# Patient Record
Sex: FEMALE | HISPANIC OR LATINO | Employment: UNEMPLOYED | URBAN - METROPOLITAN AREA
[De-identification: names, ages, dates, MRNs, and addresses within clinical notes are randomized per-mention and may not be internally consistent; named-entity substitution may affect disease eponyms.]

---

## 2024-01-08 ENCOUNTER — TELEPHONE (OUTPATIENT)
Dept: OBSTETRICS AND GYNECOLOGY | Facility: CLINIC | Age: 19
End: 2024-01-08
Payer: COMMERCIAL

## 2024-01-08 NOTE — TELEPHONE ENCOUNTER
Sandy,     Needs appt ASAP.     Thanks,   Dr. Zepeda    New Ob, approx 38wks just moved here from [place].  Requesting to establish care with you.  Reports was up to date with prenatal care in [place], has her medical records with her.    Please advise.     Thanks   Sandy    Pt #[phone]        Referral msg:  PATIENT HASN'T BEEN SEEN AT [facility]//NEW INCOMING SELF REFERRAL//WISHES TO EST CARE AND NEW OB//LISSY 1/21/24//REQUEST DR ZEPEDA OR FIRST AVAILABLE//PATIENT JUST [place]IV[place] IN THE [place] A FEW DAYS AGO AND HASN'T HAD ANY CARE IN THE [place]//PATIENT WAS UP TO DATE WITH OB CARE IN [place]//HAS MEDICAL RECORDS//SENDING CHART TO SCHEDULING REVIEW FOR APPROVAL OF TRANSFER OF CARE

## 2024-01-09 ENCOUNTER — INITIAL PRENATAL (OUTPATIENT)
Dept: OBSTETRICS AND GYNECOLOGY | Facility: CLINIC | Age: 19
End: 2024-01-09
Payer: COMMERCIAL

## 2024-01-09 VITALS
SYSTOLIC BLOOD PRESSURE: 113 MMHG | DIASTOLIC BLOOD PRESSURE: 81 MMHG | WEIGHT: 135 LBS | BODY MASS INDEX: 22.49 KG/M2 | HEIGHT: 65 IN

## 2024-01-09 DIAGNOSIS — Z78.9 VARICELLA VACCINATION STATUS UNKNOWN: ICD-10-CM

## 2024-01-09 DIAGNOSIS — Z11.3 SCREEN FOR STD (SEXUALLY TRANSMITTED DISEASE): ICD-10-CM

## 2024-01-09 DIAGNOSIS — Z12.4 SCREENING FOR CERVICAL CANCER: ICD-10-CM

## 2024-01-09 DIAGNOSIS — Z36.9 ANTENATAL SCREENING ENCOUNTER: ICD-10-CM

## 2024-01-09 DIAGNOSIS — Z34.03 ENCOUNTER FOR SUPERVISION OF NORMAL FIRST PREGNANCY IN THIRD TRIMESTER: Primary | ICD-10-CM

## 2024-01-09 LAB
GLUCOSE UR STRIP-MCNC: NEGATIVE MG/DL
PROT UR STRIP-MCNC: NEGATIVE MG/DL

## 2024-01-09 RX ORDER — SWAB
1 SWAB, NON-MEDICATED MISCELLANEOUS DAILY
Qty: 90 EACH | Refills: 3 | Status: SHIPPED | OUTPATIENT
Start: 2024-01-09

## 2024-01-09 RX ORDER — SWAB
SWAB, NON-MEDICATED MISCELLANEOUS
COMMUNITY
End: 2024-01-09

## 2024-01-09 NOTE — PROGRESS NOTES
Initial ob visit     CC- Here for care of pregnancy     Andrew Pino is being seen today for her first obstetrical visit.  She is a 18 y.o.  38w2d gestation.     # 1 - Date: None, Sex: None, Weight: None, GA: None, Delivery: None, Apgar1: None, Apgar5: None, Living: None, Birth Comments: None    [place] to [place] from [place] - 21 - had routine care in [place] up until this time     Reports overall normal prenatal care in [place] starting in first trimester. Does have records - but in [place], Did get hospitalized for pyelonephritis at 22 weeks   Reviewed what I could - overall records look normal and complete, just unable to decipher due to language issue     Prior obstetric issues, potential pregnancy concerns: transfer of care from [place], has records, but in [place]   Family history of genetic issues (includes FOB): none  Prior infections concerning in pregnancy (Rash, fever in last 2 weeks): none  Varicella Hx -negative history  Prior testing for Cystic Fibrosis Carrier or Sickle Cell Trait- unknown status   Prepregnancy BMI - Body mass index is 22.47 kg/m².  Hx of HSV for patient or partner : No      History reviewed. No pertinent past medical history.    History reviewed. No pertinent surgical history.      Current Outpatient Medications:     Prenatal 28-0.8 MG tablet, Take 1 tablet by mouth Daily. Please use formulary or generic, with DHA ideal, Disp: 90 each, Rfl: 3    No Known Allergies    Social History     Socioeconomic History    Marital status: Single   Tobacco Use    Smoking status: Never   Vaping Use    Vaping Use: Never used   Substance and Sexual Activity    Alcohol use: Never    Drug use: Never    Sexual activity: Yes     Partners: Male       Family History   Problem Relation Age of Onset    Colon cancer Maternal Uncle     Breast cancer Maternal Great-Grandmother     Ovarian cancer Neg Hx     Uterine cancer Neg Hx     Deep vein thrombosis Neg Hx     Pulmonary embolism Neg Hx   "      Review of systems     Review of Systems   Constitutional:  Negative for fever.   Eyes:  Negative for visual disturbance.   Gastrointestinal:  Negative for abdominal pain, nausea and vomiting.   Genitourinary:  Negative for breast pain, pelvic pain, vaginal bleeding and vaginal discharge.   Neurological:  Negative for headache.   All other systems reviewed and are negative.          Objective    /81   Ht 165.1 cm (65\")   Wt 61.2 kg (135 lb)   BMI 22.47 kg/m²     Prenatal Assessment  Fetal Heart Rate: 156  Fundal Height (cm): 34 cm  Movement: Present  Presentation: Vertex  Dilation/Effacement/Station  Dilation: Closed  Effacement (%): 20  Station: -2  Edema  LLE Edema: Trace  RLE Edema: Trace  Facial Edema: None    Physical Exam  Constitutional:       General: She is not in acute distress.     Appearance: Normal appearance. She is normal weight.   Genitourinary:      Right Labia: No lesions or Bartholin's cyst.     Left Labia: No lesions or Bartholin's cyst.     No inguinal adenopathy present in the right or left side.     No vaginal discharge or bleeding.        Right Adnexa: not tender, not full and no mass present.     Left Adnexa: not tender, not full and no mass present.     No cervical motion tenderness or lesion.      Uterus is enlarged.      Uterus is not tender.      No uterine mass detected.  Breasts:     Right: No inverted nipple, mass or nipple discharge.      Left: No inverted nipple, mass or nipple discharge.   HENT:      Head: Normocephalic and atraumatic.      Mouth/Throat:      Mouth: Mucous membranes are moist.   Eyes:      Conjunctiva/sclera: Conjunctivae normal.   Cardiovascular:      Rate and Rhythm: Normal rate and regular rhythm.      Pulses: Normal pulses.      Heart sounds: No murmur heard.  Pulmonary:      Effort: Pulmonary effort is normal. No respiratory distress.      Breath sounds: Normal breath sounds.   Abdominal:      General: There is distension (c/w term).      " Palpations: Abdomen is soft.      Tenderness: There is no abdominal tenderness.   Musculoskeletal:         General: No tenderness.      Cervical back: Normal range of motion and neck supple. No tenderness.      Right lower leg: Edema (trace) present.      Left lower leg: Edema present.   Lymphadenopathy:      Cervical: No cervical adenopathy.      Lower Body: No right inguinal adenopathy. No left inguinal adenopathy.   Neurological:      General: No focal deficit present.      Mental Status: She is alert.      Deep Tendon Reflexes: Reflexes normal.   Skin:     General: Skin is warm and dry.      Capillary Refill: Capillary refill takes less than 2 seconds.   Psychiatric:         Thought Content: Thought content normal.   Vitals reviewed. Exam conducted with a chaperone present.          Brief Urine Lab Results  (Last result in the past 365 days)        Color   Clarity   Blood   Leuk Est   Nitrite   Protein   CREAT   Urine HCG        24 1029           Negative                    Assessment & Plan     Diagnoses and all orders for this visit:    1. Encounter for supervision of normal first pregnancy in third trimester (Primary)  -     POC Urinalysis Dipstick  -     OB Panel With HIV  -     Urine Culture - , Urine, Clean Catch    2. Screen for STD (sexually transmitted disease)  -     Chlamydia trachomatis, Neisseria gonorrhoeae, Trichomonas vaginalis, PCR - Swab, Cervix    3. Screening for cervical cancer  -     IGP, Rfx Aptima HPV ASCU    4.  screening encounter  -     Strep B Screen - , Vaginal/Rectum  -     US Ob Follow Up Transabdominal Approach    5. Varicella vaccination status unknown  -     Varicella Zoster Antibody, IgG    Other orders  -     Prenatal 28-0.8 MG tablet; Take 1 tablet by mouth Daily. Please use formulary or generic, with DHA ideal  Dispense: 90 each; Refill: 3        1) Pregnancy at 38w2d  Routine prenatal care reviewed.   Unfortunately has good hand written records, but not in  English   So will repeat baseline testing for our own records  But care appears good and appropriate until this point  Only issue was pyelonephritis at 22 weeks            Activity recommendation : 150 minutes/week of moderate intensity aerobic activity unless we limit for bleeding, hypertension or other pregnancy complication   Patient is on Prenatal vitamins  Problem list reviewed and updated.  Reviewed routine prenatal care with the office to include but not limited to   General pregnancy recommendations reviewed including but not limited to not changing cat litter, food restrictions, avoidance of alcohol, tobacco and drugs and saunas/hot tubs.   All questions answered.     Isaías Lovell MD   1/9/2024  11:10 EST

## 2024-01-10 PROBLEM — O09.899 MATERNAL VARICELLA, NON-IMMUNE: Status: ACTIVE | Noted: 2024-01-10

## 2024-01-10 PROBLEM — Z28.39 MATERNAL VARICELLA, NON-IMMUNE: Status: ACTIVE | Noted: 2024-01-10

## 2024-01-10 LAB
ABO GROUP BLD: ABNORMAL
BASOPHILS # BLD AUTO: 0 X10E3/UL (ref 0–0.2)
BASOPHILS NFR BLD AUTO: 0 %
BLD GP AB SCN SERPL QL: NEGATIVE
EOSINOPHIL # BLD AUTO: 0.1 X10E3/UL (ref 0–0.4)
EOSINOPHIL NFR BLD AUTO: 1 %
ERYTHROCYTE [DISTWIDTH] IN BLOOD BY AUTOMATED COUNT: 12 % (ref 11.7–15.4)
HBV SURFACE AG SERPL QL IA: NEGATIVE
HCT VFR BLD AUTO: 43.5 % (ref 34–46.6)
HCV IGG SERPL QL IA: NON REACTIVE
HGB BLD-MCNC: 14.5 G/DL (ref 11.1–15.9)
HIV 1+2 AB+HIV1 P24 AG SERPL QL IA: NON REACTIVE
IMM GRANULOCYTES # BLD AUTO: 0.1 X10E3/UL (ref 0–0.1)
IMM GRANULOCYTES NFR BLD AUTO: 1 %
LYMPHOCYTES # BLD AUTO: 2.7 X10E3/UL (ref 0.7–3.1)
LYMPHOCYTES NFR BLD AUTO: 22 %
MCH RBC QN AUTO: 30.3 PG (ref 26.6–33)
MCHC RBC AUTO-ENTMCNC: 33.3 G/DL (ref 31.5–35.7)
MCV RBC AUTO: 91 FL (ref 79–97)
MONOCYTES # BLD AUTO: 0.9 X10E3/UL (ref 0.1–0.9)
MONOCYTES NFR BLD AUTO: 8 %
NEUTROPHILS # BLD AUTO: 8.2 X10E3/UL (ref 1.4–7)
NEUTROPHILS NFR BLD AUTO: 68 %
PLATELET # BLD AUTO: 250 X10E3/UL (ref 150–450)
RBC # BLD AUTO: 4.79 X10E6/UL (ref 3.77–5.28)
RH BLD: POSITIVE
RPR SER QL: NON REACTIVE
RUBV IGG SERPL IA-ACNC: 1.99 INDEX
VZV IGG SER IA-ACNC: <135 INDEX
WBC # BLD AUTO: 12 X10E3/UL (ref 3.4–10.8)

## 2024-01-11 LAB
BACTERIA UR CULT: NO GROWTH
BACTERIA UR CULT: NORMAL
C TRACH RRNA SPEC QL NAA+PROBE: NEGATIVE
CONV .: NORMAL
CYTOLOGIST CVX/VAG CYTO: NORMAL
CYTOLOGY CVX/VAG DOC CYTO: NORMAL
CYTOLOGY CVX/VAG DOC THIN PREP: NORMAL
DX ICD CODE: NORMAL
GP B STREP DNA SPEC QL NAA+PROBE: NEGATIVE
HIV 1 & 2 AB SER-IMP: NORMAL
N GONORRHOEA RRNA SPEC QL NAA+PROBE: NEGATIVE
OTHER STN SPEC: NORMAL
STAT OF ADQ CVX/VAG CYTO-IMP: NORMAL
T VAGINALIS RRNA SPEC QL NAA+PROBE: NEGATIVE

## 2024-01-16 ENCOUNTER — ROUTINE PRENATAL (OUTPATIENT)
Dept: OBSTETRICS AND GYNECOLOGY | Facility: CLINIC | Age: 19
End: 2024-01-16
Payer: COMMERCIAL

## 2024-01-16 VITALS — WEIGHT: 136 LBS | SYSTOLIC BLOOD PRESSURE: 112 MMHG | BODY MASS INDEX: 22.63 KG/M2 | DIASTOLIC BLOOD PRESSURE: 80 MMHG

## 2024-01-16 DIAGNOSIS — Z34.03 ENCOUNTER FOR SUPERVISION OF NORMAL FIRST PREGNANCY IN THIRD TRIMESTER: Primary | ICD-10-CM

## 2024-01-16 LAB
GLUCOSE UR STRIP-MCNC: NEGATIVE MG/DL
PROT UR STRIP-MCNC: NEGATIVE MG/DL

## 2024-01-16 NOTE — PROGRESS NOTES
Ob follow up    Andrew Pino is a 18 y.o.  39w2d patient being seen today for her obstetrical visit. Patient reports no complaints. Fetal movement: normal.    Her prenatal care is complicated by (and status) : emigrated from [place] around term       ROS -   Fetal Movement good   Vaginal bleeding none   Cramping/Contractions intermittent      /80   Wt 61.7 kg (136 lb)   BMI 22.63 kg/m²     Prenatal Assessment  Fetal Heart Rate: 146  Fundal Height (cm): 34 cm  Movement: Present  Presentation: Vertex  Dilation/Effacement/Station  Dilation: 1  Effacement (%): 50  Station: -2  Edema  LLE Edema: Trace  RLE Edema: Trace  Facial Edema: None      Assessment    Diagnoses and all orders for this visit:    1. Encounter for supervision of normal first pregnancy in third trimester (Primary)  -     POC Urinalysis Dipstick        1) Pregnancy at 39w2d  2) Fetal status reassuring   3) GBS status - negative  Prenatal panel normal   Pap normal   STD screen and urine culture negative   Discussed induction at 39 weeks vs await labor   Okay to await labor, discussed checking BPP next visit for Post due date with visit if still pregnant (not ordered as not past due date yet)       Plan    Labor warnings   [facility]        Isaías Lovell MD   2024  13:59 EST

## 2024-01-18 ENCOUNTER — HOSPITAL ENCOUNTER (EMERGENCY)
Facility: HOSPITAL | Age: 19
Discharge: HOME | End: 2024-01-18
Attending: OBSTETRICS & GYNECOLOGY | Admitting: OBSTETRICS & GYNECOLOGY
Payer: COMMERCIAL

## 2024-01-18 VITALS
TEMPERATURE: 98.1 F | HEART RATE: 82 BPM | BODY MASS INDEX: 22.63 KG/M2 | HEIGHT: 65 IN | RESPIRATION RATE: 16 BRPM | SYSTOLIC BLOOD PRESSURE: 117 MMHG | DIASTOLIC BLOOD PRESSURE: 75 MMHG

## 2024-01-18 LAB — A1 MICROGLOB PLACENTAL VAG QL: NEGATIVE

## 2024-01-18 PROCEDURE — 84112 EVAL AMNIOTIC FLUID PROTEIN: CPT | Performed by: OBSTETRICS & GYNECOLOGY

## 2024-01-18 PROCEDURE — 99284 EMERGENCY DEPT VISIT MOD MDM: CPT | Performed by: OBSTETRICS & GYNECOLOGY

## 2024-01-18 PROCEDURE — 59025 FETAL NON-STRESS TEST: CPT

## 2024-01-19 NOTE — OBED NOTES
"[place] [place] [facility]    Patient Name: Andrew Pino  YOB: 2005  MRN: [id number]  Admission Date: 2024  7:17 PM  Date of Service: 2024    Chief Complaint: vaginal discharge, contractions        Subjective     Andrew Pino is a 18 y.o. female  at 39w4d with Estimated Date of Delivery: 24 who presents with the chief complaint listed above. Pt reports vaginal discharge got heavy at 630 PM and began to have contractions q 10 mins at \"3/4\" pain.  Ipad used. Last cx exam 2 days ago was 1/50 %     She sees Isaías Lovell MD for her prenatal care. Her pregnancy has been complicated by:  VNI    She describes fetal movement as normal.  She not sure rupture of membranes.  She denies vaginal bleeding. She is feeling contractions.        Objective   Patient Active Problem List    Diagnosis     Maternal varicella, non-immune [O09.899, Z28.39]         OB History    Para Term  AB Living   1 0 0 0 0 0   SAB IAB Ectopic Molar Multiple Live Births   0 0 0 0 0 0      # Outcome Date GA Lbr Lito/2nd Weight Sex Delivery Anes PTL Lv   1 Current                 No past medical history on file.    No past surgical history on file.    No current facility-administered medications on file prior to encounter.     Current Outpatient Medications on File Prior to Encounter   Medication Sig Dispense Refill    Prenatal 28-0.8 MG tablet Take 1 tablet by mouth Daily. Please use formulary or generic, with DHA ideal 90 each 3       No Known Allergies    Family History   Problem Relation Age of Onset    Colon cancer Maternal Uncle     Breast cancer Maternal Great-Grandmother     Ovarian cancer Neg Hx     Uterine cancer Neg Hx     Deep vein thrombosis Neg Hx     Pulmonary embolism Neg Hx        Social History     Socioeconomic History    Marital status: Single   Tobacco Use    Smoking status: Never   Vaping Use    Vaping Use: Never used   Substance and Sexual Activity    " Alcohol use: Never    Drug use: Never    Sexual activity: Yes     Partners: Male           Review of Systems   Constitutional:  Negative for chills and fever.   HENT: Negative.     Eyes:  Negative for photophobia and visual disturbance.   Respiratory:  Negative for shortness of breath.    Cardiovascular:  Negative for chest pain.   Gastrointestinal:  Positive for abdominal pain. Negative for nausea.   Genitourinary:  Positive for vaginal discharge.   Psychiatric/Behavioral:  The patient is not nervous/anxious.           PHYSICAL EXAM:      VITAL SIGNS:  There were no vitals filed for this visit.         FHT'S:    130 with moderate variability and accels                                     PHYSICAL EXAM:      General: well developed; well nourished  no acute distress   Heart: Not performed.   Lungs   breathing is unlabored   Abdomen: Gravid and non tender     Extremities: trace edema, DTRs 1 plus, no clonus       Cervix: Per RN        Contractions:   irregular                    LABS AND TESTING ORDERED:  Uterine and fetal monitoring  Urinalysis  ROM plus, serial cervical exams    LAB RESULTS:    No results found for this or any previous visit (from the past 24 hour(s)).    Lab Results   Component Value Date    ABO AB 01/09/2024    RH Positive 01/09/2024       Lab Results   Component Value Date    STREPGPB Negative 01/09/2024                 External Prenatal Results       Pregnancy Outside Results - Transcribed From Office Records - See Scanned Records For Details       Test Value Date Time    ABO  AB  01/09/24 1129    Rh  Positive  01/09/24 1129    Antibody Screen  Negative  01/09/24 1129    Varicella IgG  <135 index 01/09/24 1129    Rubella  1.99 index 01/09/24 1129    Hgb  14.5 g/dL 01/09/24 1129    Hct  43.5 % 01/09/24 1129    Glucose Fasting GTT       Glucose Tolerance Test 1 hour       Glucose Tolerance Test 3 hour       Gonorrhea (discrete)  Negative  01/09/24 1113    Chlamydia (discrete)  Negative  01/09/24  1113    RPR  Non Reactive  24 1129    VDRL       Syphilis Antibody       HBsAg  Negative  24 1129    Herpes Simplex Virus PCR       Herpes Simplex VIrus Culture       HIV  Non Reactive  24 1129    Hep C RNA Quant PCR       Hep C Antibody  Non Reactive  24 1129    AFP       Group B Strep  Negative  24 1113    GBS Susceptibility to Clindamycin       GBS Susceptibility to Erythromycin       Fetal Fibronectin       Genetic Testing, Maternal Blood                 Drug Screening       Test Value Date Time    Urine Drug Screen       Amphetamine Screen       Barbiturate Screen       Benzodiazepine Screen       Methadone Screen       Phencyclidine Screen       Opiates Screen       THC Screen       Cocaine Screen       Propoxyphene Screen       Buprenorphine Screen       Methamphetamine Screen       Oxycodone Screen       Tricyclic Antidepressants Screen                 Legend    ^: Historical                              Impression:   @ 39w4d .  Final Diagnosis: RO ROM, labor, alina holman    Plan:  1. Discharge to home.    2. Plan of care has been reviewed with patient along with risks, benefits of treatment.   All questions have been answered. Call health care provider for any further concerns and keep office appointments.  3. Labor precautions, comfort measures      I discussed the patients findings and my recommendations with patient, family, and nursing staff      Gisella Espino MD  2024  19:39 EST

## 2024-01-23 ENCOUNTER — ROUTINE PRENATAL (OUTPATIENT)
Dept: OBSTETRICS AND GYNECOLOGY | Facility: CLINIC | Age: 19
End: 2024-01-23
Payer: COMMERCIAL

## 2024-01-23 VITALS — SYSTOLIC BLOOD PRESSURE: 107 MMHG | WEIGHT: 136 LBS | BODY MASS INDEX: 22.63 KG/M2 | DIASTOLIC BLOOD PRESSURE: 77 MMHG

## 2024-01-23 DIAGNOSIS — O48.0 POST-TERM PREGNANCY, 40-42 WEEKS OF GESTATION: ICD-10-CM

## 2024-01-23 DIAGNOSIS — Z34.03 ENCOUNTER FOR SUPERVISION OF NORMAL FIRST PREGNANCY IN THIRD TRIMESTER: Primary | ICD-10-CM

## 2024-01-23 LAB
GLUCOSE UR STRIP-MCNC: NEGATIVE MG/DL
PROT UR STRIP-MCNC: NEGATIVE MG/DL

## 2024-01-23 PROCEDURE — 99213 OFFICE O/P EST LOW 20 MIN: CPT | Performed by: OBSTETRICS & GYNECOLOGY

## 2024-01-23 NOTE — PROGRESS NOTES
Ob follow up    Andrew Pino is a 18 y.o.  40w2d patient being seen today for her obstetrical visit. Patient reports no complaints. Fetal movement: normal.    Her prenatal care is complicated by (and status) : late emigration from [place]       ROS -   Fetal Movement good   Vaginal bleeding none   Cramping/Contractions intermittent      /77   Wt 61.7 kg (136 lb)   BMI 22.63 kg/m²     Prenatal Assessment  Fetal Heart Rate: 154  Fundal Height (cm): 35 cm  Movement: Present  Presentation: Vertex  Dilation/Effacement/Station  Dilation: 1  Effacement (%): 50  Station: -2  Edema  LLE Edema: Trace  RLE Edema: Trace  Facial Edema: None      Assessment    Diagnoses and all orders for this visit:    1. Encounter for supervision of normal first pregnancy in third trimester (Primary)  -     POC Urinalysis Dipstick    2. Post-term pregnancy, 40-42 weeks of gestation  -     US Fetal Biophysical Profile;Without Non-Stress Testing; Standing        1) Pregnancy at 40w2d  2) Fetal status reassuring   3) GBS status - negative   4) Post due date   Growth AGA, Normal GRISELDA, Cephalic   BPP   Declines induction, but okay to call back and arrange if changes her mind  Plan to see with BPP next week.   Induction should be at least next Thursday - agrees     Plan    Labor warnings   [facility]        Isaías Lovell MD   2024  15:13 EST

## 2024-01-30 ENCOUNTER — TELEPHONE (OUTPATIENT)
Dept: OBSTETRICS AND GYNECOLOGY | Facility: CLINIC | Age: 19
End: 2024-01-30

## 2024-01-30 ENCOUNTER — ROUTINE PRENATAL (OUTPATIENT)
Dept: OBSTETRICS AND GYNECOLOGY | Facility: CLINIC | Age: 19
End: 2024-01-30
Payer: COMMERCIAL

## 2024-01-30 VITALS — WEIGHT: 137 LBS | SYSTOLIC BLOOD PRESSURE: 114 MMHG | BODY MASS INDEX: 22.8 KG/M2 | DIASTOLIC BLOOD PRESSURE: 74 MMHG

## 2024-01-30 DIAGNOSIS — O48.0 POST-TERM PREGNANCY, 40-42 WEEKS OF GESTATION: ICD-10-CM

## 2024-01-30 DIAGNOSIS — Z34.03 ENCOUNTER FOR SUPERVISION OF NORMAL FIRST PREGNANCY IN THIRD TRIMESTER: Primary | ICD-10-CM

## 2024-01-30 LAB
GLUCOSE UR STRIP-MCNC: NEGATIVE MG/DL
PROT UR STRIP-MCNC: NEGATIVE MG/DL

## 2024-01-30 PROCEDURE — 99214 OFFICE O/P EST MOD 30 MIN: CPT | Performed by: OBSTETRICS & GYNECOLOGY

## 2024-01-30 RX ORDER — LIDOCAINE HYDROCHLORIDE 10 MG/ML
0.5 INJECTION, SOLUTION INFILTRATION; PERINEURAL ONCE AS NEEDED
Status: CANCELLED | OUTPATIENT
Start: 2024-01-30

## 2024-01-30 RX ORDER — ONDANSETRON 4 MG/1
4 TABLET, ORALLY DISINTEGRATING ORAL EVERY 6 HOURS PRN
Status: CANCELLED | OUTPATIENT
Start: 2024-01-30

## 2024-01-30 RX ORDER — SODIUM CHLORIDE 0.9 % (FLUSH) 0.9 %
10 SYRINGE (ML) INJECTION AS NEEDED
Status: CANCELLED | OUTPATIENT
Start: 2024-01-30

## 2024-01-30 RX ORDER — SODIUM CHLORIDE 9 MG/ML
40 INJECTION, SOLUTION INTRAVENOUS AS NEEDED
Status: CANCELLED | OUTPATIENT
Start: 2024-01-30

## 2024-01-30 RX ORDER — OXYTOCIN/0.9 % SODIUM CHLORIDE 30/500 ML
2-20 PLASTIC BAG, INJECTION (ML) INTRAVENOUS
Status: CANCELLED | OUTPATIENT
Start: 2024-01-30

## 2024-01-30 RX ORDER — MAGNESIUM CARB/ALUMINUM HYDROX 105-160MG
30 TABLET,CHEWABLE ORAL ONCE AS NEEDED
Status: CANCELLED | OUTPATIENT
Start: 2024-01-30

## 2024-01-30 RX ORDER — FAMOTIDINE 10 MG/ML
20 INJECTION, SOLUTION INTRAVENOUS 2 TIMES DAILY PRN
Status: CANCELLED | OUTPATIENT
Start: 2024-01-30

## 2024-01-30 RX ORDER — OXYTOCIN/0.9 % SODIUM CHLORIDE 30/500 ML
999 PLASTIC BAG, INJECTION (ML) INTRAVENOUS ONCE
Status: CANCELLED | OUTPATIENT
Start: 2024-01-30

## 2024-01-30 RX ORDER — ACETAMINOPHEN 325 MG/1
650 TABLET ORAL EVERY 4 HOURS PRN
Status: CANCELLED | OUTPATIENT
Start: 2024-01-30

## 2024-01-30 RX ORDER — MISOPROSTOL 100 UG/1
800 TABLET ORAL ONCE AS NEEDED
Status: CANCELLED | OUTPATIENT
Start: 2024-01-30

## 2024-01-30 RX ORDER — OXYTOCIN/0.9 % SODIUM CHLORIDE 30/500 ML
250 PLASTIC BAG, INJECTION (ML) INTRAVENOUS CONTINUOUS
Status: CANCELLED | OUTPATIENT
Start: 2024-01-30 | End: 2024-01-30

## 2024-01-30 RX ORDER — SODIUM CHLORIDE 0.9 % (FLUSH) 0.9 %
10 SYRINGE (ML) INJECTION EVERY 12 HOURS SCHEDULED
Status: CANCELLED | OUTPATIENT
Start: 2024-01-30

## 2024-01-30 RX ORDER — CARBOPROST TROMETHAMINE 250 UG/ML
250 INJECTION, SOLUTION INTRAMUSCULAR
Status: CANCELLED | OUTPATIENT
Start: 2024-01-30

## 2024-01-30 RX ORDER — ONDANSETRON 2 MG/ML
4 INJECTION INTRAMUSCULAR; INTRAVENOUS EVERY 6 HOURS PRN
Status: CANCELLED | OUTPATIENT
Start: 2024-01-30

## 2024-01-30 RX ORDER — METHYLERGONOVINE MALEATE 0.2 MG/ML
200 INJECTION INTRAVENOUS ONCE AS NEEDED
Status: CANCELLED | OUTPATIENT
Start: 2024-01-30

## 2024-01-30 RX ORDER — TERBUTALINE SULFATE 1 MG/ML
0.25 INJECTION, SOLUTION SUBCUTANEOUS AS NEEDED
Status: CANCELLED | OUTPATIENT
Start: 2024-01-30

## 2024-01-30 RX ORDER — SODIUM CHLORIDE, SODIUM LACTATE, POTASSIUM CHLORIDE, CALCIUM CHLORIDE 600; 310; 30; 20 MG/100ML; MG/100ML; MG/100ML; MG/100ML
125 INJECTION, SOLUTION INTRAVENOUS CONTINUOUS
Status: CANCELLED | OUTPATIENT
Start: 2024-01-30

## 2024-01-30 RX ORDER — FAMOTIDINE 10 MG
20 TABLET ORAL 2 TIMES DAILY PRN
Status: CANCELLED | OUTPATIENT
Start: 2024-01-30

## 2024-01-30 NOTE — LETTER
24    SCHEDULING FORM  C-SECTIONS/INDUCTIONS    Patient:  Andrew Pino  :  2005    Phone: There are no phone numbers on file.    OB provider:  Isaías Lovell MD    Summary:  18 y.o. female     Type of Delivery:  Induction   Priority:  Elective    Desired Date: 24      Time: 0001    Dating   Estimated Date of Delivery: 24    Gestational age at Desired date of Induction or CS: 41 weeks 4 days  ----------------------------------------------------------------------------  By ACOG Guidelines, women should be 39 weeks or greater before initiating an elective induction (non-medically indicated) delivery     Maternal Indications:        Fetal/Placental Conditions:      ----------------------------------------------------------------------------    For patients less than 39 weeks with indications not included in the above list, [facility] consult is required prior to scheduling.    [facility] Approved indication:       Date of consult:      I attest that the above entries are accurate to the best of my knowledge:    Isaías Lovell MD  2024  16:22 EST

## 2024-01-30 NOTE — PATIENT INSTRUCTIONS
You have scheduled the birth of your baby and are ready to welcome a new member of your family. To prepare you for that day, review these helpful instructions.     INDUCTION   In preparation for this we have you call L&D approximately 2 hours prior to scheduled time to arrive @ [phone]  Please be aware that when you are calling it is to see if they are able to have you arrive at the expected time.   Sometimes they may have to delay the start time due to ongoing issues on L&D (room availability, staffing).   If that happens, rest assured we are working to get you in as soon as safe and reasonable.   During any wait times, it is okay to eat and drink, but we would prefer you avoid heavy or large meals if possible.     BEFORE YOUR  SECTION:  You should have nothing to eat or drink 8 hours prior to the scheduled time of your surgery, typically midnight the evening before surgery  You should arrive 2 hours prior to the start time of your procedure with anyone planning to go back with you for the procedure.   We suggest taking two 500 mg tylenol the evening before surgery to help with pain management.   We also suggest drinking a 20 oz sports drink 2-2.5 hours before surgery (typically on the way to the hospital)  Please do not apply any lotions within 48 hours prior to surgery/ delivery.  Please do not shave within 48 hours prior to surgery/ delivery.   Please bathe/shower with an antibacterial soap the night before your surgery.

## 2024-01-30 NOTE — PROGRESS NOTES
Ob follow up    Andrew Pino is a 18 y.o.  41w2d patient being seen today for her obstetrical visit. Patient reports no complaints. Fetal movement: normal.    Her prenatal care is complicated by (and status) : Late emigration from [place]       ROS -   Fetal Movement good   Vaginal bleeding none   Cramping/Contractions intermittent      /74   Wt 62.1 kg (137 lb)   BMI 22.80 kg/m²     Prenatal Assessment  Fetal Heart Rate: 134  Fundal Height (cm): 35 cm  Movement: Present  Presentation: Vertex  Dilation/Effacement/Station  Dilation: 1  Effacement (%): 50  Station: -2  Edema  LLE Edema: Trace  RLE Edema: Trace  Facial Edema: None      Assessment    Diagnoses and all orders for this visit:    1. Encounter for supervision of normal first pregnancy in third trimester (Primary)    2. Post-term pregnancy, 40-42 weeks of gestation  -     POC Urinalysis Dipstick  -     Labor Induction  -     sodium chloride 0.9 % flush 10 mL  -     sodium chloride 0.9 % flush 10 mL  -     sodium chloride 0.9 % infusion 40 mL  -     lidocaine (XYLOCAINE) 1 % injection 0.5 mL  -     lactated ringers bolus 1,000 mL  -     lactated ringers infusion  -     acetaminophen (TYLENOL) tablet 650 mg  -     ondansetron ODT (ZOFRAN-ODT) disintegrating tablet 4 mg  -     ondansetron (ZOFRAN) injection 4 mg  -     oxytocin (PITOCIN) 30 units in 0.9% sodium chloride 500 mL (premix)  -     terbutaline (BRETHINE) injection 0.25 mg  -     mineral oil liquid 30 mL  -     famotidine (PEPCID) injection 20 mg  -     famotidine (PEPCID) tablet 20 mg  -     oxytocin (PITOCIN) 30 units in 0.9% sodium chloride 500 mL (premix)  -     oxytocin (PITOCIN) 30 units in 0.9% sodium chloride 500 mL (premix)  -     methylergonovine (METHERGINE) injection 200 mcg  -     carboprost (HEMABATE) injection 250 mcg  -     miSOPROStol (CYTOTEC) tablet 800 mcg  -     Tranexamic Acid 1,000 mg in sodium chloride 0.9 % 100 mL    Other orders  -     Admit To  Obstetrics Inpatient; Standing  -     Code Status and Medical Interventions:; Standing  -     Obtain Informed Consent; Standing  -     Place Sequential Compression Device; Standing  -     Maintain Sequential Compression Device; Standing  -     Vital Signs q 4 while awake; Standing  -     Vital Signs Per Hospital Policy; Standing  -     Mini-Prep Prior to Delivery; Standing  -     Continuous Fetal Monitoring With NST on Admission and Prior to Initiation of Oxytocin.; Standing  -     External Uterine Contraction Monitoring; Standing  -     Notify Provider (Specified); Standing  -     Notify Provider of Tachysystole (Per Hospital Algorithm); Standing  -     Notify Provider if Membranes Ruptured, Bleeding Greater Than 1 Pad Per Hour, Fetal Heart Tone Abnormality or Severe Pain; Standing  -     Initiate Group Beta Strep (GBS) Prophylaxis Protocol, If Criteria Met; Standing  -     Position Change - For Intra-Uterine Resusitation for Hypertonus, HyperStimulation or Non-Reassuring Fetal Status; Standing  -     NPO Diet NPO Type: Ice Chips; Standing  -     Inpatient Anesthesiology Consult; Standing  -     T Pallidum Antibody w/ reflex RPR; Standing  -     CBC & Differential; Standing  -     Comprehensive Metabolic Panel; Standing  -     Type & Screen; Standing  -     Insert Peripheral IV; Standing  -     Saline Lock & Maintain IV Access; Standing  -     Notify Provider (Specified); Standing  -     Vital Signs Per Hospital Policy; Standing  -     Fundal & Lochia Check; Standing  -     Fundal & Lochia Check; Standing  -     Diet: Regular/House Diet; Texture: Regular Texture (IDDSI 7); Fluid Consistency: Thin (IDDSI 0); Standing  -     Advance Diet As Tolerated -; Standing        1) Pregnancy at 41w2d  2) Fetal status reassuring   3) GBS status - negative   4) post dates  BPP today cephalic, GRISELDA normal and BPP 8/8   Discussed but now that 41+ weeks should consider induction       Plan    Labor warnings   [facility]        Isaías HASSAN  MD Nas   1/30/2024  16:24 EST

## 2024-01-31 ENCOUNTER — ANESTHESIA EVENT (OUTPATIENT)
Dept: LABOR AND DELIVERY | Facility: HOSPITAL | Age: 19
End: 2024-01-31
Payer: COMMERCIAL

## 2024-01-31 ENCOUNTER — HOSPITAL ENCOUNTER (INPATIENT)
Facility: HOSPITAL | Age: 19
LOS: 2 days | Discharge: HOME | End: 2024-02-02
Attending: OBSTETRICS & GYNECOLOGY | Admitting: OBSTETRICS & GYNECOLOGY
Payer: COMMERCIAL

## 2024-01-31 ENCOUNTER — ANESTHESIA (OUTPATIENT)
Dept: LABOR AND DELIVERY | Facility: HOSPITAL | Age: 19
End: 2024-01-31
Payer: COMMERCIAL

## 2024-01-31 DIAGNOSIS — O48.0 POST-TERM PREGNANCY, 40-42 WEEKS OF GESTATION: ICD-10-CM

## 2024-01-31 PROBLEM — Z3A.41 POST TERM PREGNANCY AT 41 WEEKS GESTATION: Status: ACTIVE | Noted: 2024-01-31

## 2024-01-31 LAB
ABO GROUP BLD: NORMAL
ALBUMIN SERPL-MCNC: 4 G/DL (ref 3.5–5.2)
ALBUMIN/GLOB SERPL: 1.2 G/DL
ALP SERPL-CCNC: 174 U/L (ref 39–117)
ALT SERPL W P-5'-P-CCNC: 12 U/L (ref 1–33)
AMPHET+METHAMPHET UR QL: NEGATIVE
ANION GAP SERPL CALCULATED.3IONS-SCNC: 9.7 MMOL/L (ref 5–15)
AST SERPL-CCNC: 17 U/L (ref 1–32)
BARBITURATES UR QL SCN: NEGATIVE
BASOPHILS # BLD AUTO: 0.04 10*3/MM3 (ref 0–0.2)
BASOPHILS NFR BLD AUTO: 0.3 % (ref 0–1.5)
BENZODIAZ UR QL SCN: NEGATIVE
BILIRUB SERPL-MCNC: 0.3 MG/DL (ref 0–1.2)
BLD GP AB SCN SERPL QL: NEGATIVE
BUN SERPL-MCNC: 5 MG/DL (ref 6–20)
BUN/CREAT SERPL: 6.8 (ref 7–25)
CALCIUM SPEC-SCNC: 9.7 MG/DL (ref 8.6–10.5)
CANNABINOIDS SERPL QL: NEGATIVE
CHLORIDE SERPL-SCNC: 105 MMOL/L (ref 98–107)
CO2 SERPL-SCNC: 24.3 MMOL/L (ref 22–29)
COCAINE UR QL: NEGATIVE
CREAT SERPL-MCNC: 0.74 MG/DL (ref 0.57–1)
DEPRECATED RDW RBC AUTO: 37.5 FL (ref 37–54)
EGFRCR SERPLBLD CKD-EPI 2021: 119 ML/MIN/1.73
EOSINOPHIL # BLD AUTO: 0.05 10*3/MM3 (ref 0–0.4)
EOSINOPHIL NFR BLD AUTO: 0.4 % (ref 0.3–6.2)
ERYTHROCYTE [DISTWIDTH] IN BLOOD BY AUTOMATED COUNT: 11.7 % (ref 12.3–15.4)
FENTANYL UR-MCNC: NEGATIVE NG/ML
GLOBULIN UR ELPH-MCNC: 3.3 GM/DL
GLUCOSE SERPL-MCNC: 77 MG/DL (ref 65–99)
HCT VFR BLD AUTO: 46.2 % (ref 34–46.6)
HGB BLD-MCNC: 15.3 G/DL (ref 12–15.9)
IMM GRANULOCYTES # BLD AUTO: 0.07 10*3/MM3 (ref 0–0.05)
IMM GRANULOCYTES NFR BLD AUTO: 0.6 % (ref 0–0.5)
LYMPHOCYTES # BLD AUTO: 2.96 10*3/MM3 (ref 0.7–3.1)
LYMPHOCYTES NFR BLD AUTO: 24.3 % (ref 19.6–45.3)
MCH RBC QN AUTO: 29.4 PG (ref 26.6–33)
MCHC RBC AUTO-ENTMCNC: 33.1 G/DL (ref 31.5–35.7)
MCV RBC AUTO: 88.8 FL (ref 79–97)
METHADONE UR QL SCN: NEGATIVE
MONOCYTES # BLD AUTO: 0.82 10*3/MM3 (ref 0.1–0.9)
MONOCYTES NFR BLD AUTO: 6.7 % (ref 5–12)
NEUTROPHILS NFR BLD AUTO: 67.7 % (ref 42.7–76)
NEUTROPHILS NFR BLD AUTO: 8.25 10*3/MM3 (ref 1.7–7)
NRBC BLD AUTO-RTO: 0 /100 WBC (ref 0–0.2)
OPIATES UR QL: NEGATIVE
OXYCODONE UR QL SCN: NEGATIVE
PLATELET # BLD AUTO: 265 10*3/MM3 (ref 140–450)
PMV BLD AUTO: 11 FL (ref 6–12)
POTASSIUM SERPL-SCNC: 4.3 MMOL/L (ref 3.5–5.2)
PROT SERPL-MCNC: 7.3 G/DL (ref 6–8.5)
RBC # BLD AUTO: 5.2 10*6/MM3 (ref 3.77–5.28)
RH BLD: POSITIVE
SODIUM SERPL-SCNC: 139 MMOL/L (ref 136–145)
T PALLIDUM IGG SER QL: NORMAL
T&S EXPIRATION DATE: NORMAL
WBC NRBC COR # BLD AUTO: 12.19 10*3/MM3 (ref 3.4–10.8)

## 2024-01-31 PROCEDURE — 80307 DRUG TEST PRSMV CHEM ANLYZR: CPT | Performed by: OBSTETRICS & GYNECOLOGY

## 2024-01-31 PROCEDURE — 25010000002 ROPIVACAINE PER 1 MG: Performed by: ANESTHESIOLOGY

## 2024-01-31 PROCEDURE — 85025 COMPLETE CBC W/AUTO DIFF WBC: CPT | Performed by: OBSTETRICS & GYNECOLOGY

## 2024-01-31 PROCEDURE — 86901 BLOOD TYPING SEROLOGIC RH(D): CPT | Performed by: OBSTETRICS & GYNECOLOGY

## 2024-01-31 PROCEDURE — 0HQ9XZZ REPAIR PERINEUM SKIN, EXTERNAL APPROACH: ICD-10-PCS | Performed by: OBSTETRICS & GYNECOLOGY

## 2024-01-31 PROCEDURE — 86850 RBC ANTIBODY SCREEN: CPT | Performed by: OBSTETRICS & GYNECOLOGY

## 2024-01-31 PROCEDURE — C1755 CATHETER, INTRASPINAL: HCPCS | Performed by: ANESTHESIOLOGY

## 2024-01-31 PROCEDURE — 86900 BLOOD TYPING SEROLOGIC ABO: CPT | Performed by: OBSTETRICS & GYNECOLOGY

## 2024-01-31 PROCEDURE — 99202 OFFICE O/P NEW SF 15 MIN: CPT | Performed by: OBSTETRICS & GYNECOLOGY

## 2024-01-31 PROCEDURE — 59410 OBSTETRICAL CARE: CPT | Performed by: OBSTETRICS & GYNECOLOGY

## 2024-01-31 PROCEDURE — 25810000003 LACTATED RINGERS PER 1000 ML: Performed by: OBSTETRICS & GYNECOLOGY

## 2024-01-31 PROCEDURE — 86780 TREPONEMA PALLIDUM: CPT | Performed by: OBSTETRICS & GYNECOLOGY

## 2024-01-31 PROCEDURE — 80053 COMPREHEN METABOLIC PANEL: CPT | Performed by: OBSTETRICS & GYNECOLOGY

## 2024-01-31 RX ORDER — TRANEXAMIC ACID 10 MG/ML
1000 INJECTION, SOLUTION INTRAVENOUS ONCE AS NEEDED
Status: DISCONTINUED | OUTPATIENT
Start: 2024-01-31 | End: 2024-02-01

## 2024-01-31 RX ORDER — METHYLERGONOVINE MALEATE 0.2 MG/ML
200 INJECTION INTRAVENOUS ONCE AS NEEDED
Status: DISCONTINUED | OUTPATIENT
Start: 2024-01-31 | End: 2024-01-31 | Stop reason: HOSPADM

## 2024-01-31 RX ORDER — SODIUM CHLORIDE, SODIUM LACTATE, POTASSIUM CHLORIDE, CALCIUM CHLORIDE 600; 310; 30; 20 MG/100ML; MG/100ML; MG/100ML; MG/100ML
125 INJECTION, SOLUTION INTRAVENOUS CONTINUOUS
Status: DISCONTINUED | OUTPATIENT
Start: 2024-01-31 | End: 2024-02-01

## 2024-01-31 RX ORDER — OXYTOCIN/0.9 % SODIUM CHLORIDE 30/500 ML
250 PLASTIC BAG, INJECTION (ML) INTRAVENOUS CONTINUOUS
Status: DISPENSED | OUTPATIENT
Start: 2024-01-31 | End: 2024-01-31

## 2024-01-31 RX ORDER — ONDANSETRON 2 MG/ML
4 INJECTION INTRAMUSCULAR; INTRAVENOUS ONCE AS NEEDED
Status: DISCONTINUED | OUTPATIENT
Start: 2024-01-31 | End: 2024-01-31 | Stop reason: HOSPADM

## 2024-01-31 RX ORDER — SODIUM CHLORIDE 9 MG/ML
40 INJECTION, SOLUTION INTRAVENOUS AS NEEDED
Status: DISCONTINUED | OUTPATIENT
Start: 2024-01-31 | End: 2024-01-31 | Stop reason: HOSPADM

## 2024-01-31 RX ORDER — CARBOPROST TROMETHAMINE 250 UG/ML
250 INJECTION, SOLUTION INTRAMUSCULAR
Status: DISCONTINUED | OUTPATIENT
Start: 2024-01-31 | End: 2024-01-31 | Stop reason: HOSPADM

## 2024-01-31 RX ORDER — EPHEDRINE SULFATE 50 MG/ML
5 INJECTION, SOLUTION INTRAVENOUS
Status: DISCONTINUED | OUTPATIENT
Start: 2024-01-31 | End: 2024-01-31 | Stop reason: HOSPADM

## 2024-01-31 RX ORDER — DIPHENHYDRAMINE HYDROCHLORIDE 50 MG/ML
12.5 INJECTION INTRAMUSCULAR; INTRAVENOUS EVERY 8 HOURS PRN
Status: DISCONTINUED | OUTPATIENT
Start: 2024-01-31 | End: 2024-01-31 | Stop reason: HOSPADM

## 2024-01-31 RX ORDER — FAMOTIDINE 20 MG/1
20 TABLET, FILM COATED ORAL 2 TIMES DAILY PRN
Status: DISCONTINUED | OUTPATIENT
Start: 2024-01-31 | End: 2024-01-31 | Stop reason: HOSPADM

## 2024-01-31 RX ORDER — ONDANSETRON 4 MG/1
4 TABLET, ORALLY DISINTEGRATING ORAL EVERY 6 HOURS PRN
Status: DISCONTINUED | OUTPATIENT
Start: 2024-01-31 | End: 2024-01-31 | Stop reason: HOSPADM

## 2024-01-31 RX ORDER — OXYTOCIN/0.9 % SODIUM CHLORIDE 30/500 ML
999 PLASTIC BAG, INJECTION (ML) INTRAVENOUS ONCE
Status: COMPLETED | OUTPATIENT
Start: 2024-01-31 | End: 2024-01-31

## 2024-01-31 RX ORDER — FENTANYL CIT 0.2 MG/100ML-ROPIV 0.2%-NACL 0.9% EPIDURAL INJ 2/0.2 MCG/ML-%
10 SOLUTION INJECTION CONTINUOUS
Status: DISCONTINUED | OUTPATIENT
Start: 2024-01-31 | End: 2024-02-01

## 2024-01-31 RX ORDER — ONDANSETRON 2 MG/ML
4 INJECTION INTRAMUSCULAR; INTRAVENOUS EVERY 6 HOURS PRN
Status: DISCONTINUED | OUTPATIENT
Start: 2024-01-31 | End: 2024-01-31 | Stop reason: HOSPADM

## 2024-01-31 RX ORDER — LIDOCAINE HYDROCHLORIDE 10 MG/ML
0.5 INJECTION, SOLUTION INFILTRATION; PERINEURAL ONCE AS NEEDED
Status: DISCONTINUED | OUTPATIENT
Start: 2024-01-31 | End: 2024-01-31 | Stop reason: HOSPADM

## 2024-01-31 RX ORDER — TERBUTALINE SULFATE 1 MG/ML
0.25 INJECTION, SOLUTION SUBCUTANEOUS AS NEEDED
Status: DISCONTINUED | OUTPATIENT
Start: 2024-01-31 | End: 2024-01-31 | Stop reason: HOSPADM

## 2024-01-31 RX ORDER — OXYTOCIN/0.9 % SODIUM CHLORIDE 30/500 ML
2-20 PLASTIC BAG, INJECTION (ML) INTRAVENOUS
Status: DISCONTINUED | OUTPATIENT
Start: 2024-01-31 | End: 2024-01-31 | Stop reason: HOSPADM

## 2024-01-31 RX ORDER — ROPIVACAINE HYDROCHLORIDE 2 MG/ML
INJECTION, SOLUTION EPIDURAL; INFILTRATION; PERINEURAL AS NEEDED
Status: DISCONTINUED | OUTPATIENT
Start: 2024-01-31 | End: 2024-01-31 | Stop reason: SURG

## 2024-01-31 RX ORDER — SODIUM CHLORIDE 0.9 % (FLUSH) 0.9 %
10 SYRINGE (ML) INJECTION EVERY 12 HOURS SCHEDULED
Status: DISCONTINUED | OUTPATIENT
Start: 2024-01-31 | End: 2024-01-31 | Stop reason: HOSPADM

## 2024-01-31 RX ORDER — ACETAMINOPHEN 325 MG/1
650 TABLET ORAL EVERY 4 HOURS PRN
Status: DISCONTINUED | OUTPATIENT
Start: 2024-01-31 | End: 2024-01-31 | Stop reason: HOSPADM

## 2024-01-31 RX ORDER — SODIUM CHLORIDE 0.9 % (FLUSH) 0.9 %
10 SYRINGE (ML) INJECTION AS NEEDED
Status: DISCONTINUED | OUTPATIENT
Start: 2024-01-31 | End: 2024-01-31 | Stop reason: HOSPADM

## 2024-01-31 RX ORDER — FAMOTIDINE 10 MG/ML
20 INJECTION, SOLUTION INTRAVENOUS 2 TIMES DAILY PRN
Status: DISCONTINUED | OUTPATIENT
Start: 2024-01-31 | End: 2024-01-31 | Stop reason: HOSPADM

## 2024-01-31 RX ORDER — FAMOTIDINE 10 MG/ML
20 INJECTION, SOLUTION INTRAVENOUS ONCE AS NEEDED
Status: DISCONTINUED | OUTPATIENT
Start: 2024-01-31 | End: 2024-01-31 | Stop reason: HOSPADM

## 2024-01-31 RX ORDER — MAGNESIUM CARB/ALUMINUM HYDROX 105-160MG
30 TABLET,CHEWABLE ORAL ONCE AS NEEDED
Status: DISCONTINUED | OUTPATIENT
Start: 2024-01-31 | End: 2024-01-31 | Stop reason: HOSPADM

## 2024-01-31 RX ORDER — MISOPROSTOL 200 UG/1
800 TABLET ORAL ONCE AS NEEDED
Status: DISCONTINUED | OUTPATIENT
Start: 2024-01-31 | End: 2024-01-31 | Stop reason: HOSPADM

## 2024-01-31 RX ADMIN — SODIUM CHLORIDE, POTASSIUM CHLORIDE, SODIUM LACTATE AND CALCIUM CHLORIDE 125 ML/HR: 600; 310; 30; 20 INJECTION, SOLUTION INTRAVENOUS at 11:05

## 2024-01-31 RX ADMIN — LIDOCAINE HYDROCHLORIDE 3 ML: 10; .005 INJECTION, SOLUTION EPIDURAL; INFILTRATION; INTRACAUDAL; PERINEURAL at 13:45

## 2024-01-31 RX ADMIN — SODIUM CHLORIDE, POTASSIUM CHLORIDE, SODIUM LACTATE AND CALCIUM CHLORIDE 125 ML/HR: 600; 310; 30; 20 INJECTION, SOLUTION INTRAVENOUS at 18:36

## 2024-01-31 RX ADMIN — Medication 10 ML/HR: at 13:51

## 2024-01-31 RX ADMIN — ROPIVACAINE HYDROCHLORIDE 10 ML: 2 INJECTION, SOLUTION EPIDURAL; INFILTRATION at 13:48

## 2024-01-31 RX ADMIN — Medication 999 ML/HR: at 19:33

## 2024-01-31 RX ADMIN — Medication 10 ML/HR: at 18:48

## 2024-01-31 RX ADMIN — SODIUM CHLORIDE, POTASSIUM CHLORIDE, SODIUM LACTATE AND CALCIUM CHLORIDE 125 ML/HR: 600; 310; 30; 20 INJECTION, SOLUTION INTRAVENOUS at 13:52

## 2024-01-31 NOTE — PLAN OF CARE
Problem: Adult Inpatient Plan of Care  Goal: Plan of Care Review  Outcome: Ongoing, Progressing  Flowsheets (Taken 1/31/2024 1529)  Progress: improving  Plan of Care Reviewed With:   patient   significant other  Outcome Evaluation: pt admitted for labor this am, pt has progressed to 4-5cm, epidural in and pt comfortable,  used for admission and education     Problem: Adult Inpatient Plan of Care  Goal: Patient-Specific Goal (Individualized)  Outcome: Ongoing, Progressing  Flowsheets  Taken 1/31/2024 1529  Patient-Specific Goals (Include Timeframe): vaginal delivery with good pain relief  Individualized Care Needs:   Taken 1/31/2024 1116  Anxieties, Fears or Concerns: first baby   Goal Outcome Evaluation:  Plan of Care Reviewed With: patient, significant other        Progress: improving  Outcome Evaluation: pt admitted for labor this am, pt has progressed to 4-5cm, epidural in and pt comfortable,  used for admission and education

## 2024-01-31 NOTE — OBED NOTES
"ROBERT Note OB    Patient Name: Andrew Pino  YOB: 2005  MRN: [id number]  Admission Date: 2024 10:11 AM  Date of Service: 2024    Chief Complaint: Contractions (ROBERT at 41 3/7)        Subjective     Andrew Pino is a 18 y.o. female  at 41w3d with Estimated Date of Delivery: 24 who presents with the chief complaint listed above. Pt reports contractions started at about 5 am, q 2-3 mins, \"7/10\". She received PNC elsewhere where a 6 week USG agrees with today's EDC. Pt would like to hold off on epidural as long as she can.  used for this interview     She sees Isaías Lovell MD for her prenatal care. Her pregnancy has been complicated by:  VNI    She describes fetal movement as normal.  She denies rupture of membranes.  She denies vaginal bleeding. She is feeling contractions.        Objective   Patient Active Problem List    Diagnosis     Maternal varicella, non-immune [O09.899, Z28.39]         OB History    Para Term  AB Living   1 0 0 0 0 0   SAB IAB Ectopic Molar Multiple Live Births   0 0 0 0 0 0      # Outcome Date GA Lbr Lito/2nd Weight Sex Delivery Anes PTL Lv   1 Current                 No past medical history on file.    No past surgical history on file.    No current facility-administered medications on file prior to encounter.     Current Outpatient Medications on File Prior to Encounter   Medication Sig Dispense Refill    Prenatal 28-0.8 MG tablet Take 1 tablet by mouth Daily. Please use formulary or generic, with DHA ideal 90 each 3       No Known Allergies    Family History   Problem Relation Age of Onset    Colon cancer Maternal Uncle     Breast cancer Maternal Great-Grandmother     Ovarian cancer Neg Hx     Uterine cancer Neg Hx     Deep vein thrombosis Neg Hx     Pulmonary embolism Neg Hx        Social History     Socioeconomic History    Marital status: Single   Tobacco Use    Smoking status: Never   Vaping " Use    Vaping Use: Never used   Substance and Sexual Activity    Alcohol use: Never    Drug use: Never    Sexual activity: Yes     Partners: Male           Review of Systems   Constitutional:  Negative for chills and fever.   HENT: Negative.     Eyes:  Negative for photophobia and visual disturbance.   Respiratory:  Negative for shortness of breath.    Cardiovascular:  Negative for chest pain.   Gastrointestinal:  Positive for abdominal pain. Negative for nausea.   Psychiatric/Behavioral:  The patient is not nervous/anxious.           PHYSICAL EXAM:      VITAL SIGNS:  There were no vitals filed for this visit.         FHT'S:    130 with moderate variability and accels                                     PHYSICAL EXAM:      General: well developed; well nourished  no acute distress   Heart: Not performed.   Lungs   breathing is unlabored   Abdomen: Gravid and non tender     Extremities: trace edema, DTRs 1 plus, no clonus       Cervix: Per RN  Cervical Dilation (cm): 2  Cervical Effacement: 80%  Fetal Station: -2  Cervical Consistency: soft  Cervical Position: posterior     Contractions:   regular                    LABS AND TESTING ORDERED:  Uterine and fetal monitoring  Urinalysis  Admit labs    LAB RESULTS:    Recent Results (from the past 24 hour(s))   POC Urinalysis Dipstick    Collection Time: 01/30/24  3:52 PM    Specimen: Urine   Result Value Ref Range    Glucose, UA Negative Negative mg/dL    Protein, POC Negative Negative mg/dL       Lab Results   Component Value Date    ABO AB 01/09/2024    RH Positive 01/09/2024       Lab Results   Component Value Date    STREPGPB Negative 01/09/2024                 External Prenatal Results       Pregnancy Outside Results - Transcribed From Office Records - See Scanned Records For Details       Test Value Date Time    ABO  AB  01/09/24 1129    Rh  Positive  01/09/24 1129    Antibody Screen  Negative  01/09/24 1129    Varicella IgG  <135 index 01/09/24 1129    Rubella   1.99 index 24 1129    Hgb  14.5 g/dL 24 1129    Hct  43.5 % 24 1129    Glucose Fasting GTT       Glucose Tolerance Test 1 hour       Glucose Tolerance Test 3 hour       Gonorrhea (discrete)  Negative  24 1113    Chlamydia (discrete)  Negative  24 1113    RPR  Non Reactive  24 1129    VDRL       Syphilis Antibody       HBsAg  Negative  24 1129    Herpes Simplex Virus PCR       Herpes Simplex VIrus Culture       HIV  Non Reactive  24 1129    Hep C RNA Quant PCR       Hep C Antibody  Non Reactive  24 1129    AFP       Group B Strep  Negative  24 1113    GBS Susceptibility to Clindamycin       GBS Susceptibility to Erythromycin       Fetal Fibronectin       Genetic Testing, Maternal Blood                 Drug Screening       Test Value Date Time    Urine Drug Screen       Amphetamine Screen       Barbiturate Screen       Benzodiazepine Screen       Methadone Screen       Phencyclidine Screen       Opiates Screen       THC Screen       Cocaine Screen       Propoxyphene Screen       Buprenorphine Screen       Methamphetamine Screen       Oxycodone Screen       Tricyclic Antidepressants Screen                 Legend    ^: Historical                              Impression:   @ 41w3d .  Final Diagnosis: early active labor, post dates    Plan:   Discussed with Dr Baires who will admit fetal and uterine monitoring  continuously, labor augmentation  Pitocin, and analgesia with  epidural        Gisella Espino MD  2024  10:37 EST

## 2024-01-31 NOTE — PROGRESS NOTES
OB Note    Patient comfortable with epidural in place.  VSS, afebrile.  Cervix C/C/+3  FHT reassuring.  Begin second stage/allow pushing.    Casper Main MD   No

## 2024-01-31 NOTE — ANESTHESIA PREPROCEDURE EVALUATION
Anesthesia Evaluation                  Airway   Mallampati: II  TM distance: >3 FB  Neck ROM: full  Dental - normal exam     Pulmonary - normal exam   Cardiovascular - normal exam        Neuro/Psych  GI/Hepatic/Renal/Endo      Musculoskeletal     Abdominal    Substance History      OB/GYN    (+) Pregnant        Other                    Anesthesia Plan    ASA 2     epidural     (I have reviewed the patient's history with the patient and the chart, including all pertinent laboratory results and imaging. I have explained the risks of epidural anesthesia including but not limited to hypotension, PDPH, nerve injury, and risk of failure/replacement. Patient understands risks and agrees to proceed.     used.    41w3d gestation)    Anesthetic plan, risks, benefits, and alternatives have been provided, discussed and informed consent has been obtained with: patient.    CODE STATUS:    Level Of Support Discussed With: Patient  Code Status (Patient has no pulse and is not breathing): CPR (Attempt to Resuscitate)  Medical Interventions (Patient has pulse or is breathing): Full Support

## 2024-01-31 NOTE — ANESTHESIA PROCEDURE NOTES
Labor Epidural      Patient location during procedure: OB  Performed By  Anesthesiologist: Alonzo Pichardo MD  Preanesthetic Checklist  Completed: patient identified, risks and benefits discussed, pre-op evaluation and timeout performed  Additional Notes  41w3d gestation    Prep:  Pt Position:sitting  Sterile Tech:cap, gloves, mask and sterile barrier  Prep:chlorhexidine gluconate and isopropyl alcohol  Monitoring:blood pressure monitoring, continuous pulse oximetry and EKG  Epidural Block Procedure:  Approach:midline  Guidance:landmark technique  Location:L3-L4  Needle Type:Tuohy  Needle Gauge:17  Loss of Resistance Medium: air  Loss of Resistance: 5cm  Cath Depth at skin:10 cm  Paresthesia: none  Aspiration:negative  Test Dose:negative  Number of Attempts: 1  Post Assessment:  Dressing:occlusive dressing applied and secured with tape  Pt Tolerance:patient tolerated the procedure well with no apparent complications  Complications:no

## 2024-02-01 LAB
BASOPHILS # BLD AUTO: 0.06 10*3/MM3 (ref 0–0.2)
BASOPHILS NFR BLD AUTO: 0.3 % (ref 0–1.5)
BUPRENORPHINE UR QL: NEGATIVE NG/ML
DEPRECATED RDW RBC AUTO: 39.4 FL (ref 37–54)
EOSINOPHIL # BLD AUTO: 0.05 10*3/MM3 (ref 0–0.4)
EOSINOPHIL NFR BLD AUTO: 0.2 % (ref 0.3–6.2)
ERYTHROCYTE [DISTWIDTH] IN BLOOD BY AUTOMATED COUNT: 11.9 % (ref 12.3–15.4)
HCT VFR BLD AUTO: 44.2 % (ref 34–46.6)
HGB BLD-MCNC: 15.1 G/DL (ref 12–15.9)
IMM GRANULOCYTES # BLD AUTO: 0.13 10*3/MM3 (ref 0–0.05)
IMM GRANULOCYTES NFR BLD AUTO: 0.6 % (ref 0–0.5)
LYMPHOCYTES # BLD AUTO: 3.21 10*3/MM3 (ref 0.7–3.1)
LYMPHOCYTES NFR BLD AUTO: 15.8 % (ref 19.6–45.3)
MCH RBC QN AUTO: 30.8 PG (ref 26.6–33)
MCHC RBC AUTO-ENTMCNC: 34.2 G/DL (ref 31.5–35.7)
MCV RBC AUTO: 90.2 FL (ref 79–97)
MONOCYTES # BLD AUTO: 1.22 10*3/MM3 (ref 0.1–0.9)
MONOCYTES NFR BLD AUTO: 6 % (ref 5–12)
NEUTROPHILS NFR BLD AUTO: 15.69 10*3/MM3 (ref 1.7–7)
NEUTROPHILS NFR BLD AUTO: 77.1 % (ref 42.7–76)
NRBC BLD AUTO-RTO: 0 /100 WBC (ref 0–0.2)
PLATELET # BLD AUTO: 269 10*3/MM3 (ref 140–450)
PMV BLD AUTO: 11 FL (ref 6–12)
RBC # BLD AUTO: 4.9 10*6/MM3 (ref 3.77–5.28)
WBC NRBC COR # BLD AUTO: 20.36 10*3/MM3 (ref 3.4–10.8)

## 2024-02-01 PROCEDURE — 0503F POSTPARTUM CARE VISIT: CPT

## 2024-02-01 PROCEDURE — 85025 COMPLETE CBC W/AUTO DIFF WBC: CPT | Performed by: OBSTETRICS & GYNECOLOGY

## 2024-02-01 RX ORDER — PRENATAL VIT/IRON FUM/FOLIC AC 27MG-0.8MG
1 TABLET ORAL DAILY
Status: DISCONTINUED | OUTPATIENT
Start: 2024-02-01 | End: 2024-02-02 | Stop reason: HOSPADM

## 2024-02-01 RX ORDER — HYDROCORTISONE 25 MG/G
CREAM TOPICAL AS NEEDED
Status: DISCONTINUED | OUTPATIENT
Start: 2024-02-01 | End: 2024-02-02 | Stop reason: HOSPADM

## 2024-02-01 RX ORDER — ACETAMINOPHEN 325 MG/1
650 TABLET ORAL EVERY 6 HOURS PRN
Status: DISCONTINUED | OUTPATIENT
Start: 2024-02-01 | End: 2024-02-02 | Stop reason: HOSPADM

## 2024-02-01 RX ORDER — CALCIUM CARBONATE 500 MG/1
2 TABLET, CHEWABLE ORAL 3 TIMES DAILY PRN
Status: DISCONTINUED | OUTPATIENT
Start: 2024-02-01 | End: 2024-02-02 | Stop reason: HOSPADM

## 2024-02-01 RX ORDER — BISACODYL 10 MG
10 SUPPOSITORY, RECTAL RECTAL DAILY PRN
Status: DISCONTINUED | OUTPATIENT
Start: 2024-02-02 | End: 2024-02-02 | Stop reason: HOSPADM

## 2024-02-01 RX ORDER — IBUPROFEN 600 MG/1
600 TABLET ORAL EVERY 6 HOURS PRN
Status: DISCONTINUED | OUTPATIENT
Start: 2024-02-01 | End: 2024-02-02 | Stop reason: HOSPADM

## 2024-02-01 RX ORDER — OXYTOCIN/0.9 % SODIUM CHLORIDE 30/500 ML
125 PLASTIC BAG, INJECTION (ML) INTRAVENOUS CONTINUOUS PRN
Status: DISCONTINUED | OUTPATIENT
Start: 2024-02-01 | End: 2024-02-02 | Stop reason: HOSPADM

## 2024-02-01 RX ORDER — ONDANSETRON 2 MG/ML
4 INJECTION INTRAMUSCULAR; INTRAVENOUS EVERY 6 HOURS PRN
Status: DISCONTINUED | OUTPATIENT
Start: 2024-02-01 | End: 2024-02-02 | Stop reason: HOSPADM

## 2024-02-01 RX ORDER — ONDANSETRON 4 MG/1
4 TABLET, ORALLY DISINTEGRATING ORAL EVERY 8 HOURS PRN
Status: DISCONTINUED | OUTPATIENT
Start: 2024-02-01 | End: 2024-02-02 | Stop reason: HOSPADM

## 2024-02-01 RX ORDER — TEMAZEPAM 7.5 MG/1
7.5 CAPSULE ORAL NIGHTLY PRN
Status: DISCONTINUED | OUTPATIENT
Start: 2024-02-01 | End: 2024-02-02 | Stop reason: HOSPADM

## 2024-02-01 RX ORDER — DOCUSATE SODIUM 100 MG/1
100 CAPSULE, LIQUID FILLED ORAL 2 TIMES DAILY
Status: DISCONTINUED | OUTPATIENT
Start: 2024-02-01 | End: 2024-02-02 | Stop reason: HOSPADM

## 2024-02-01 RX ORDER — TRAMADOL HYDROCHLORIDE 50 MG/1
50 TABLET ORAL EVERY 6 HOURS PRN
Status: DISCONTINUED | OUTPATIENT
Start: 2024-02-01 | End: 2024-02-02 | Stop reason: HOSPADM

## 2024-02-01 RX ORDER — SODIUM CHLORIDE 0.9 % (FLUSH) 0.9 %
1-10 SYRINGE (ML) INJECTION AS NEEDED
Status: DISCONTINUED | OUTPATIENT
Start: 2024-02-01 | End: 2024-02-02 | Stop reason: HOSPADM

## 2024-02-01 RX ORDER — DIPHENHYDRAMINE HCL 25 MG
25 CAPSULE ORAL EVERY 6 HOURS PRN
Status: DISCONTINUED | OUTPATIENT
Start: 2024-02-01 | End: 2024-02-02 | Stop reason: HOSPADM

## 2024-02-01 RX ADMIN — DOCUSATE SODIUM 100 MG: 100 CAPSULE, LIQUID FILLED ORAL at 20:22

## 2024-02-01 RX ADMIN — ACETAMINOPHEN 325MG 650 MG: 325 TABLET ORAL at 04:39

## 2024-02-01 RX ADMIN — IBUPROFEN 600 MG: 600 TABLET, FILM COATED ORAL at 02:19

## 2024-02-01 RX ADMIN — IBUPROFEN 600 MG: 600 TABLET, FILM COATED ORAL at 10:26

## 2024-02-01 RX ADMIN — DOCUSATE SODIUM 100 MG: 100 CAPSULE, LIQUID FILLED ORAL at 10:27

## 2024-02-01 RX ADMIN — Medication 1 TABLET: at 10:26

## 2024-02-01 NOTE — H&P
Patient is a 18 y.o. female G1 admitted at 41+ weeks in active labor.  Prenatal care occurred mostly in [place] with seemingly uncomplicated care.  She had one episode of pyelonephritis and was hospitalized before arriving in [place].      Patient Active Problem List   Diagnosis    Maternal varicella, non-immune    Post term pregnancy at 41 weeks gestation     Prenatal care is complicated by postterm pregnancy.    History reviewed. No pertinent past medical history.    History reviewed. No pertinent surgical history.    No Known Allergies    Social History     Socioeconomic History    Marital status: Single   Tobacco Use    Smoking status: Never   Vaping Use    Vaping Use: Never used   Substance and Sexual Activity    Alcohol use: Never    Drug use: Never    Sexual activity: Yes     Partners: Male       Physical Exam  Gen: Alert  Vitals:    24 1901   BP: 112/72   Pulse: (!) 137   Resp:    Temp:    SpO2:      HEENT: WNL  Abdomen: Gravid.  Cervix:  C/C/+3  FHT: Category 1    Assessment/Plan: IUP at 41 weeks in active labor, now at second stage.  - Begin pushing.  Anticipate .    Casper Main MD

## 2024-02-01 NOTE — L&D DELIVERY NOTE
DELIVERY REPORT    [facility]  Patient: Andrew Pino  : 2005  Age: 18 y.o.  Unit Number: [id number]    DATE OF DELIVERY: 2024    PREOPERATIVE DIAGNOSIS:  Intrauterine Pregnancy at 41+ weeks.  Spontaneous Labor.    POSTOPERATIVE DIAGNOSIS:  same as pre-op diagnosis    PROCEDURE PERFORMED:   Spontaneous Vaginal Delivery    SURGEON: Casper Main MD    ASSISTANT:   None    ANESTHESIA:   Epidural    ESTIMATED BLOOD LOSS:   100 ml    FINDINGS:    Live Viable Female Infant // Apgars 8 at one minute and 9 at five minutes // 6lb 5oz Weight.             Normal placenta and cord                        1st Degree Vaginal Laceration    DESCRIPTION OF PROCEDURE: Patient is a 18 year old  at 41+ weeks admitted in active labor at term.  Prenatal care began in [place] and appeared uncomplicated.  Patient underwent amniotomy.  She had reassuring fetal tracing and received an epidural.  She reached the second stage and had excellent pushing effort.  Delivery team was assembled and perineum was prepped with betadine.  Patient pushed for less than 20 minutes and delivered a live viable female infant in the occiput anterior position.  Nose and mouth were bulb suctioned upon presentation of the head.  With continued maternal effort, the right anterior shoulder, torso and body delivered easily.  Infant was vigorous, delayed cord clamping occurred, the cord was cut and infant was presented to parents.  Cord blood was obtained and placenta was delivered spontaneously intact with 3 vessel cord noted.  A first degree vaginal laceration was repaired with 3-0 Monocryl.  Pitocin was given for prevention of atony.    COMPLICATIONS: None  SPECIMEN:  Cord blood  DISPOSITION:  Mother and baby remained in LDR in stable condition       Casper Main MD  Completed: 2024  [unfilled]

## 2024-02-01 NOTE — ANESTHESIA POSTPROCEDURE EVALUATION
Patient: Andrew Pino    Procedure Summary       Date: 01/31/24 Room / Location:     Anesthesia Start: 1331 Anesthesia Stop: 1930    Procedure: LABOR ANALGESIA Diagnosis:     Scheduled Providers:  Provider: Pako Chaudhari MD    Anesthesia Type: epidural ASA Status: 2            Anesthesia Type: epidural    Vitals  Vitals Value Taken Time   /54 01/31/24 1946   Temp 37.1 °C (98.8 °F) 01/31/24 1411   Pulse 102 01/31/24 1955   Resp 18 01/31/24 1411   SpO2 100 % 01/31/24 1955   Vitals shown include unfiled device data.        Post Anesthesia Care and Evaluation      Comments: An anesthesiologist was immediately available throughout the labor and delivery period.

## 2024-02-01 NOTE — PLAN OF CARE
Goal Outcome Evaluation:              Outcome Evaluation: 0PP vag delivery, fundus and lochia WNL, pain controlled with ibuprofen and Tylenol, up ad beka,  used for all interactions and education, breastfeeding well.

## 2024-02-01 NOTE — PLAN OF CARE
Problem: Adult Inpatient Plan of Care  Goal: Absence of Hospital-Acquired Illness or Injury  Intervention: Prevent Skin Injury  Recent Flowsheet Documentation  Taken 2024 by Alayna Arredondo RN  Body Position: sitting up in bed  Goal: Optimal Comfort and Wellbeing  Intervention: Monitor Pain and Promote Comfort  Recent Flowsheet Documentation  Taken 2024 by Alayna Arredondo RN  Pain Management Interventions: pillow support provided     Problem: Adult Inpatient Plan of Care  Goal: Optimal Comfort and Wellbeing  Intervention: Monitor Pain and Promote Comfort  Recent Flowsheet Documentation  Taken 2024 by Alayna Arredondo RN  Pain Management Interventions: pillow support provided     Problem: Bleeding (Labor)  Goal: Hemostasis  Outcome: Met  Intervention: Monitor for Presence of Bleeding  Recent Flowsheet Documentation  Taken 2024 by Alayna Arredondo RN   Bleed Management: uterine massage provided (delivered)  Intervention: Manage Bleeding  Recent Flowsheet Documentation  Taken 2024 by Alayna Arredondo RN   Bleed Management: uterine massage provided (delivered)   Goal Outcome Evaluation:             if viable female @ 1930, FF@ U -3. Lochia scant. not complaints of pain. Breastfeeding with assist. Anticipate transition to postpartum after recovery.

## 2024-02-01 NOTE — PROGRESS NOTES
VAGINAL DELIVERY POSTPARTUM DAY 1    2/1/2024  PATIENT: Andrew Pino        MR#:[id number]  LOCATION: [facility]  DATE OF ADMISSION: 1/31/2024  ADMISSION  DIAGNOSIS:   Post term pregnancy at 41 weeks gestation     CURRENT DIAGNOSIS: No notes have been filed under this hospital service.  Service: Hospitalist      SUBJECTIVE     Andrew feels well.  Patient describes her lochia as less than menses.  Pain is well controlled.        OBJECTIVE   Temp: Temp:  [98.1 °F (36.7 °C)-99.1 °F (37.3 °C)] 98.1 °F (36.7 °C) Temp src: Oral   BP: BP: ()/(54-83) 103/68        Pulse: Heart Rate:  [] 84  RR: Resp:  [16-18] 16    General:  Awake, alert, no acute distress   Cardiac: Regular rate and rhythm    Respiratory: Lungs clear bilaterally, normal respiratory effort    Abdomen: Soft, non-distended, fundus firm, below umbilicus, appropriately tender   Pelvis: deferred   Extremities: Calves NT bilaterally, DTR 2+, no clonus noted, trace edema     Lab Results   Component Value Date    WBC 12.19 (H) 01/31/2024    HGB 15.3 01/31/2024    HCT 46.2 01/31/2024     01/31/2024    AST 17 01/31/2024    ALT 12 01/31/2024    CREATININE 0.74 01/31/2024       ASSESSMENT:  Postpartum day 1 after vaginal delivery. Hgb: 15.3 yesterday, hgb pending today.    PLAN:Doing well. Continue routine supportive care. Discontinue IV, advance diet, may shower. Plan for discharge tomorrow as clinical picture allows.     Bre Warren CNM  08:53 EST  February 1, 2024

## 2024-02-01 NOTE — ANESTHESIA POSTPROCEDURE EVALUATION
Patient: Andrew Pino    Procedure Summary       Date: 01/31/24 Room / Location:     Anesthesia Start: 1331 Anesthesia Stop: 1930    Procedure: LABOR ANALGESIA Diagnosis:     Scheduled Providers:  Provider: Pako Chaudhari MD    Anesthesia Type: epidural ASA Status: 2            Anesthesia Type: epidural    Vitals  Vitals Value Taken Time   /54 01/31/24 1946   Temp 37.1 °C (98.8 °F) 01/31/24 1411   Pulse 102 01/31/24 1955   Resp 18 01/31/24 1411   SpO2 100 % 01/31/24 1955   Vitals shown include unfiled device data.        Anesthesia Post Evaluation

## 2024-02-02 VITALS
OXYGEN SATURATION: 99 % | HEART RATE: 67 BPM | BODY MASS INDEX: 22.99 KG/M2 | WEIGHT: 138 LBS | HEIGHT: 65 IN | RESPIRATION RATE: 16 BRPM | SYSTOLIC BLOOD PRESSURE: 119 MMHG | TEMPERATURE: 98.1 F | DIASTOLIC BLOOD PRESSURE: 84 MMHG

## 2024-02-02 PROCEDURE — 90686 IIV4 VACC NO PRSV 0.5 ML IM: CPT | Performed by: OBSTETRICS & GYNECOLOGY

## 2024-02-02 PROCEDURE — 0503F POSTPARTUM CARE VISIT: CPT | Performed by: NURSE PRACTITIONER

## 2024-02-02 PROCEDURE — 25010000002 INFLUENZA VAC SPLIT QUAD 0.5 ML SUSPENSION PREFILLED SYRINGE: Performed by: OBSTETRICS & GYNECOLOGY

## 2024-02-02 PROCEDURE — G0008 ADMIN INFLUENZA VIRUS VAC: HCPCS | Performed by: OBSTETRICS & GYNECOLOGY

## 2024-02-02 RX ORDER — IBUPROFEN 600 MG/1
600 TABLET ORAL EVERY 8 HOURS PRN
Qty: 90 TABLET | Refills: 1 | Status: SHIPPED | OUTPATIENT
Start: 2024-02-02

## 2024-02-02 RX ORDER — PSEUDOEPHEDRINE HCL 30 MG
100 TABLET ORAL 2 TIMES DAILY
Qty: 30 CAPSULE | Refills: 0 | Status: SHIPPED | OUTPATIENT
Start: 2024-02-02

## 2024-02-02 RX ADMIN — VARICELLA VIRUS VACCINE LIVE 0.5 ML: 1350 INJECTION, POWDER, LYOPHILIZED, FOR SUSPENSION SUBCUTANEOUS at 13:47

## 2024-02-02 RX ADMIN — IBUPROFEN 600 MG: 600 TABLET, FILM COATED ORAL at 01:59

## 2024-02-02 RX ADMIN — INFLUENZA A VIRUS A/VICTORIA/4897/2022 IVR-238 (H1N1) ANTIGEN (FORMALDEHYDE INACTIVATED), INFLUENZA A VIRUS A/DARWIN/9/2021 SAN-010 (H3N2) ANTIGEN (FORMALDEHYDE INACTIVATED), INFLUENZA B VIRUS B/PHUKET/3073/2013 ANTIGEN (FORMALDEHYDE INACTIVATED), AND INFLUENZA B VIRUS B/MICHIGAN/01/2021 ANTIGEN (FORMALDEHYDE INACTIVATED) 0.5 ML: 15; 15; 15; 15 INJECTION, SUSPENSION INTRAMUSCULAR at 13:27

## 2024-02-02 RX ADMIN — Medication 1 TABLET: at 09:20

## 2024-02-02 RX ADMIN — DOCUSATE SODIUM 100 MG: 100 CAPSULE, LIQUID FILLED ORAL at 09:20

## 2024-02-02 RX ADMIN — TRAMADOL HYDROCHLORIDE 50 MG: 50 TABLET ORAL at 03:09

## 2024-02-02 NOTE — PROGRESS NOTES
Postpartum Progress Note      Status post Vaginal Delivery: Doing well postoperatively.     1) Postpartum care immediately following delivery :    Routine care, discharge home today. Reviewed discharge instructions in detail.  used for this visit.     Hgb: 15.1  Rh status: AB+  Syphilis screen in hospital: non-reactive  Rubella: Immune  Gender: female    Subjective     Postpartum Day 2: Vaginal delivery    The patient feels well. The patient denies emotional concerns. Pain is well controlled with current medications. The baby is well. The patient is ambulating well. The patient is tolerating a normal diet.     Objective     Vital signs in last 24 hours:  Temp:  [97.5 °F (36.4 °C)-98.5 °F (36.9 °C)] 98.1 °F (36.7 °C)  Heart Rate:  [67-98] 67  Resp:  [16] 16  BP: ()/(64-66) 99/64      General:    alert, appears stated age, and cooperative   CV: RRR, no m/r/g   Lungs: CTAB   Abdomen:  Soft, Non-tender    Lochia:  appropriate   Uterine Fundus:   firm   Ext    Edema trace   DVT Evaluation:  No evidence of DVT seen on physical exam.     Lab Results   Component Value Date    WBC 20.36 (H) 02/01/2024    HGB 15.1 02/01/2024    HCT 44.2 02/01/2024    MCV 90.2 02/01/2024     02/01/2024       Lynne Odonnell, APRN  2/2/2024  08:21 EST

## 2024-02-02 NOTE — PLAN OF CARE
Goal Outcome Evaluation:            Progressing well, voids without diff, mostly bottle feeding, pain controlled. Minimal bleeding

## 2024-02-02 NOTE — DISCHARGE SUMMARY
Date of Discharge:  2/2/2024    Discharge Diagnosis: s/p vaginal delivery     Presenting Problem/History of Present Illness  Post term pregnancy at 41 weeks gestation [O48.0, Z3A.41]     Hospital Course  Patient is a 18 y.o. female presented in labor.  Her pregnancy was uncomplicated.  She delivered a viable female infant weighing 6lb 5oz with apgars of 8&9. For further information surrounding this delivery please seen delivery note. Her postpartum course has been uncomplicated. She is voiding adequately, tolerating a regular diet, and she is ambulating without difficulty or assistance. Her pain is well controlled. We have reviewed discharge instructions in detail.     Procedures Performed         Consults:   Consults       No orders found from 1/2/2024 to 2/1/2024.            Pertinent Test Results:   WBC   Date Value Ref Range Status   02/01/2024 20.36 (H) 3.40 - 10.80 10*3/mm3 Final     RBC   Date Value Ref Range Status   02/01/2024 4.90 3.77 - 5.28 10*6/mm3 Final     Hemoglobin   Date Value Ref Range Status   02/01/2024 15.1 12.0 - 15.9 g/dL Final     Hematocrit   Date Value Ref Range Status   02/01/2024 44.2 34.0 - 46.6 % Final     MCV   Date Value Ref Range Status   02/01/2024 90.2 79.0 - 97.0 fL Final     MCH   Date Value Ref Range Status   02/01/2024 30.8 26.6 - 33.0 pg Final     MCHC   Date Value Ref Range Status   02/01/2024 34.2 31.5 - 35.7 g/dL Final     RDW   Date Value Ref Range Status   02/01/2024 11.9 (L) 12.3 - 15.4 % Final     RDW-SD   Date Value Ref Range Status   02/01/2024 39.4 37.0 - 54.0 fl Final     MPV   Date Value Ref Range Status   02/01/2024 11.0 6.0 - 12.0 fL Final     Platelets   Date Value Ref Range Status   02/01/2024 269 140 - 450 10*3/mm3 Final     Neutrophil %   Date Value Ref Range Status   02/01/2024 77.1 (H) 42.7 - 76.0 % Final     Lymphocyte %   Date Value Ref Range Status   02/01/2024 15.8 (L) 19.6 - 45.3 % Final     Monocyte %   Date Value Ref Range Status   02/01/2024 6.0  5.0 - 12.0 % Final     Eosinophil %   Date Value Ref Range Status   02/01/2024 0.2 (L) 0.3 - 6.2 % Final     Basophil %   Date Value Ref Range Status   02/01/2024 0.3 0.0 - 1.5 % Final     Immature Grans %   Date Value Ref Range Status   02/01/2024 0.6 (H) 0.0 - 0.5 % Final     Neutrophils, Absolute   Date Value Ref Range Status   02/01/2024 15.69 (H) 1.70 - 7.00 10*3/mm3 Final     Lymphocytes, Absolute   Date Value Ref Range Status   02/01/2024 3.21 (H) 0.70 - 3.10 10*3/mm3 Final     Monocytes, Absolute   Date Value Ref Range Status   02/01/2024 1.22 (H) 0.10 - 0.90 10*3/mm3 Final     Eosinophils, Absolute   Date Value Ref Range Status   02/01/2024 0.05 0.00 - 0.40 10*3/mm3 Final     Basophils, Absolute   Date Value Ref Range Status   02/01/2024 0.06 0.00 - 0.20 10*3/mm3 Final     Immature Grans, Absolute   Date Value Ref Range Status   02/01/2024 0.13 (H) 0.00 - 0.05 10*3/mm3 Final     nRBC   Date Value Ref Range Status   02/01/2024 0.0 0.0 - 0.2 /100 WBC Final       Condition on Discharge:  Stable    Vital Signs  Temp:  [97.5 °F (36.4 °C)-98.5 °F (36.9 °C)] 98.1 °F (36.7 °C)  Heart Rate:  [67-98] 67  Resp:  [16] 16  BP: ()/(64-66) 99/64    Physical Exam:   See Progress Note    Discharge Disposition  Home or Self Care    Discharge Medications     Discharge Medications        New Medications        Instructions Start Date   docusate sodium 100 MG capsule   100 mg, Oral, 2 Times Daily      ibuprofen 600 MG tablet  Commonly known as: ADVIL,MOTRIN   600 mg, Oral, Every 8 Hours PRN             Continue These Medications        Instructions Start Date   Prenatal 28-0.8 MG tablet   1 tablet, Oral, Daily, Please use formulary or generic, with DHA ideal               Discharge Diet: Regular    Activity at Discharge: Pelvic rest X6 weeks    Education: Warning signs and symptoms given, no tub baths, nothing in the vagina for 6 weeks, no driving for 2 weeks or while talking narcotics     Follow-up Appointments  No  future appointments.  Additional Instructions for the Follow-ups that You Need to Schedule       Discharge Follow-up with Specified Provider: Nas; 6 Weeks   As directed      To: Nas   Follow Up: 6 Weeks   Follow Up Details: Postpartum follow up                Test Results Pending at Discharge       RICKY Jiménez  02/02/24  08:24 EST    Time: 08:24 EST

## 2024-02-02 NOTE — LACTATION NOTE
"This note was copied from a baby's chart.  P1 T - SGA infant, 23 hrs old, documented feeding plan is \"both breast & formula\".   #[id number] assisted.  Mom reports that Bf'g is going well, she feels baby is latching well.  She has some formula bottles sitting on crib & reports that she BF's baby first then supplements with formula.  Her nipples are a little tender, given lanolin with instructions.  She has an electric PBP.    Education provided: feeding frequency/duration; diaper expectations; milk production in relation to infant’s small stomach size; drops of colostrum being normal the first few days progressing to increasing amounts of milk & eventually full supply 3-5 days after delivery.  Verbalized understanding.     Mother denies questions/concerns at this time.  Encouraged to call for help when needed.  HAYLEY # on WB.     "

## 2024-02-09 ENCOUNTER — MATERNAL SCREENING (OUTPATIENT)
Dept: CALL CENTER | Facility: HOSPITAL | Age: 19
End: 2024-02-09
Payer: COMMERCIAL

## 2024-02-09 NOTE — OUTREACH NOTE
Maternal Screening Survey      Flowsheet Row Responses   [facility]   [facility]? Yes  [Raheem ID [id number]]   Call start time    Call end time    Person spoke with today (if not patient) and relationship patient   EPD Scale: Able to Laugh 0-->as much as she always could   EPD Scale: Looked Forward 0-->as much as she ever did   EPD Scale: Blamed Self 0-->no, never   EPD Scale: Been Anxious 0-->no, not at all   EPD Scale: Felt Panicky 0-->no, not at all   EPD Scale: Things Getting on Top 0-->no, has been coping as well as ever   EPD Scale: Difficulty Sleeping 0-->no, not at all   EPD Scale: Sad or Miserable 0-->no, not at all   EPD Scale: Crying 0-->no, never   EPD Scale: Thought of Harming Self 0-->never   [place]  Depression Score 0   Did any of your parents have problems with alcohol or drug use? No   Do any of your peers have problems with alcohol or drug use? No   Does your partner have problems with alcohol or drug use? No   Before you were pregnant did you have problems with alcohol or drug use? (past) No   In the past month, did you drink beer, wine, liquor or use any other drugs? (pregnancy) No   Maternal Screening call completed Yes   Call end time               Costa VAZQUEZ - Registered Nurse